# Patient Record
Sex: MALE | Race: OTHER | NOT HISPANIC OR LATINO | URBAN - METROPOLITAN AREA
[De-identification: names, ages, dates, MRNs, and addresses within clinical notes are randomized per-mention and may not be internally consistent; named-entity substitution may affect disease eponyms.]

---

## 2018-09-24 ENCOUNTER — EMERGENCY (EMERGENCY)
Facility: HOSPITAL | Age: 28
LOS: 1 days | Discharge: HOME | End: 2018-09-24
Attending: EMERGENCY MEDICINE
Payer: COMMERCIAL

## 2018-09-24 VITALS
HEIGHT: 72 IN | DIASTOLIC BLOOD PRESSURE: 99 MMHG | WEIGHT: 195.11 LBS | TEMPERATURE: 98 F | OXYGEN SATURATION: 96 % | SYSTOLIC BLOOD PRESSURE: 140 MMHG | RESPIRATION RATE: 16 BRPM | HEART RATE: 87 BPM

## 2018-09-24 PROCEDURE — 73130 X-RAY EXAM OF HAND: CPT

## 2018-09-24 PROCEDURE — 99283 EMERGENCY DEPT VISIT LOW MDM: CPT | Mod: 25

## 2018-09-24 PROCEDURE — 73130 X-RAY EXAM OF HAND: CPT | Mod: 26,LT

## 2018-09-24 PROCEDURE — 29130 APPL FINGER SPLINT STATIC: CPT

## 2018-09-24 PROCEDURE — 29130 APPL FINGER SPLINT STATIC: CPT | Mod: F3

## 2018-09-24 NOTE — ED ADULT NURSE NOTE - NSIMPLEMENTINTERV_GEN_ALL_ED
Implemented All Universal Safety Interventions:  Wheeler to call system. Call bell, personal items and telephone within reach. Instruct patient to call for assistance. Room bathroom lighting operational. Non-slip footwear when patient is off stretcher. Physically safe environment: no spills, clutter or unnecessary equipment. Stretcher in lowest position, wheels locked, appropriate side rails in place.

## 2018-09-24 NOTE — ED PROVIDER NOTE - OBJECTIVE STATEMENT
27 yo male with no PMHx complaining of left index finger deformity after playing soccer. Patient denies pain, and is unsure how it happened, stating "I must have jammed it"

## 2018-09-24 NOTE — ED PROVIDER NOTE - PHYSICAL EXAMINATION
distal index finger joint stiff and bent forward. patient unable to move it. +2 PP, with no other decreased ROM.

## 2018-09-24 NOTE — ED PROVIDER NOTE - ATTENDING CONTRIBUTION TO CARE
patient with malletfinger deformity on exam. patient unable to extend finger at DIP joint. suspect extensor tendon injury. finger splint applied by [facility] home with orthopedics hand specialist.